# Patient Record
(demographics unavailable — no encounter records)

---

## 2024-12-02 NOTE — HISTORY OF PRESENT ILLNESS
[Current] : Current [TextBox_13] : Ms. DRISCOLL is a 67 year old female.   She was called to review eligibility for Low-Dose CT lung cancer screening.  Reviewed and confirmed that the patient meets screening eligibility criteria:   67 years old   Smoking Status:  Current Smoker   Number of pack(s) per day: 1/2 Number of years smoked: 40 Number of pack years smokin   No symptoms of lung cancer, including new cough, change in cough, hemoptysis, and unintentional weight loss.   No personal history of lung cancer.  No lung cancer in a first degree relative.  No history of lung disease or occupational exposures. [PacksperYear] : 20

## 2024-12-04 NOTE — ASSESSMENT
[FreeTextEntry1] : We reviewed the findings and the history. Questions were answered and concerns addressed. The options were outlined. Medication use discussed.  MDP is prescribed.  PT is planned.   Patient seen by Dr. Levy Smalls, who determined the assessment and plan. Beth GAONA participated in the care of the patient, including the history and physical exam. Cinthya SOLIS, am scribing for Dr. Levy Smalls in his presence for the chief complaint, physical exam, studies, assessment, and/or plan.

## 2024-12-04 NOTE — PHYSICAL EXAM
[Right] : right shoulder [] : no swelling [Sitting] : sitting [FreeTextEntry9] : IR to T12 (this is unchanged since the surgery) L SHOULDER: 165/75/T8. [TWNoteComboBox4] : passive forward flexion 165 degrees [de-identified] : external rotation 75 degrees

## 2024-12-04 NOTE — PHYSICAL EXAM
[Right] : right shoulder [] : no swelling [Sitting] : sitting [FreeTextEntry9] : IR to T12 (this is unchanged since the surgery) L SHOULDER: 165/75/T8. [TWNoteComboBox4] : passive forward flexion 165 degrees [de-identified] : external rotation 75 degrees

## 2024-12-04 NOTE — CONSULT LETTER
[Dear  ___] : Dear  [unfilled], [Consult Letter:] : I had the pleasure of evaluating your patient, [unfilled]. [Please see my note below.] : Please see my note below. [Consult Closing:] : Thank you very much for allowing me to participate in the care of this patient.  If you have any questions, please do not hesitate to contact me. [Sincerely,] : Sincerely, [FreeTextEntry3] : Levy Smalls M.D. Shoulder Surgery

## 2024-12-04 NOTE — IMAGING
[Right] : right shoulder [FreeTextEntry1] : 2V: The GH is OK.  There are AC changes. [FreeTextEntry5] : 2V: There is a Type I acromion with a prior acromioplasty.

## 2024-12-04 NOTE — REASON FOR VISIT
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  No injury.  On 3/10/24, Dr. Smalls performed a Right Shoulder Arthroscopy, Glenohumeral Debridement, Subacromial Decompression, Supraspinatus Repair (6.5mm SpiraLock), Distal Clavicle Resection.  She recovered very well, with no issues.  There can be pain at night.  Reaching is sore.  She has tried Advil and Tylenol.  No numbness, though she has right hand issues.  There is a trigger finger, and she had prior R CMC joint reconstruction. Her  recently had his second total hip replacement and she has increased her activity level.

## 2024-12-04 NOTE — HISTORY OF PRESENT ILLNESS
[4] : 4 [0] : 0 [Dull/Aching] : dull/aching [Constant] : constant [Household chores] : household chores [Leisure] : leisure [Sleep] : sleep [Lying in bed] : lying in bed [Retired] : Work status: retired [de-identified] : Right shoulder pain for a few months. No known injury. [] : no [FreeTextEntry1] : Right shoulder [FreeTextEntry9] : meloxicam  and/or Tylenol [de-identified] : Dr Smalls [de-identified] : 2008 [de-identified] : xray and sono

## 2024-12-12 NOTE — PROCEDURE
[FreeTextEntry1] : Full PFT reveals normal flows; FEV1 was 2.51 L which is 133% of predicted; normal lung volumes; normal diffusion at 4.86, which is 122% of predicted; normal flow volume loop. PFTs were performed to evaluate for SOB and COPD  FENO was 10; a normal value being less than 25 Fractional exhaled nitric oxide (FENO) is regarded as a simple, noninvasive method for assessing eosinophilic airway inflammation. Produced by a variety of cells within the lung, nitric oxide (NO) concentrations are generally low in healthy individuals. However, high concentrations of NO appear to be involved in nonspecific host defense mechanisms and chronic inflammatory diseases such as asthma. The American Thoracic Society (ATS) therefore has recommended using FENO to aid in the diagnosis and monitoring of eosinophilic airway inflammation and asthma, and for identifying steroid responsive individuals whose chronic respiratory symptoms may be caused by airway inflammation.   The American Thoracic Society (ATS) strongly recommends the use of FeNO measurement to aid in the assessment, management, and long-term monitoring of asthma. In their 2011 clinical practice guideline, the ATS emphasizes the importance of using FeNO.

## 2024-12-12 NOTE — HISTORY OF PRESENT ILLNESS
[FreeTextEntry1] : Ms. Aguero is a 67 year old female with a history of abnormal chest CT, allergic rhinitis, asthma, COPD, current smoker, fatigue, GERD, MARK, PND, presenting to the office today for a follow up pulmonary evaluation. Her chief complaint is  -she notes feeling generally well -she notes having a device placed in her heart  -she notes energy levels are improved  -she notes reporting she had a small stroke which has affected her balance slightly  -she denies dysphonia -she notes vision is stable -she notes SOB when bending over  -she notes good quality of sleep -she notes she is using her CPAP   -she denies any headaches, nausea, emesis, fever, chills, sweats, chest pain, chest pressure, coughing, wheezing, palpitations, diarrhea, constipation, dysphagia, vertigo, arthralgias, myalgias, leg swelling, itchy eyes, itchy ears, heartburn, reflux, or sour taste in the mouth.

## 2024-12-12 NOTE — ASSESSMENT
[FreeTextEntry1] : Ms. Maldonado is a 67 year old female who has a history of immunodeficiency, DM, HLD, Hypothyroid, MTHFR mutation, asthma, allergy, GERD, OSAS, abnormal CT, LPR Sx; s/p RSV infection - symptoms of LPR; stable, retired- CT c/w respiratory bronchiolitis s/p PFO closure (s/p CVA)  Her shortness of breath is multifactorial due to: -likely allergy related -asthma -COPD -current everyday smoking -? CAD -poor breathing mechanics -GERD  problem 1: asthma/COPD (controlled) -continue Trelegy 200 1 inhalation QD -continue Albuterol nebulizer Q6H (gargle and spit after use) -continue to use Ventolin PRN -continue Singulair 10 mg QHS -continue Daliresp 250 mg QD -she is being recommended to increase her compliance -Asthma is believed to be caused by inherited (genetic) and environmental factor, but its exact cause is unknown. Asthma may be triggered by allergens, lung infections, or irritants in the air. Asthma triggers are different for each person -COPD is a progressive disease and although it can't be cured , appropriate management can slow its progression, reduce frequency and severity of exacerbations, and improve symptoms and the patient quality of life. Hospitalizations are the greatest contributor to the total COPD costs and account for up to 87% of total COPD related costs. Exacerbations are the main cause of admissions and subsequently account for the 40-75% of COPD costs. Inhaled maintenance therapy reduces the incidence of exacerbations in patients with stable COPD. Incorrect inhaler use and nonadherence are major obstacles to achieving COPD treatment goals. Many COPD patients have challenges (impaired inhalation, limited dexterity, reduced cognition: that limit their ability to correctly use their COPD treatment devices resulting in reduced symptom control. Of most importance is smoking cessation and early intervention with respiratory illnesses and contemplation for pulmonary rehab to enhance quality of life. -Inhaler technique reviewed as well as oral hygiene techniques reviewed with patient. Avoidance of cold air, extremes of temperature, rescue inhaler should be used before exercise. Order of medication reviewed with patient. Recommended use of a cool mist humidifier in the bedroom.  problem 1 A: cardiac (CVA) -s/p PFO closure 2024 -Tawana  problem 2: allergies and sinuses (quiet) -add Pulmicort 0.5% via Navage  -s/p  OTC antihistamine -continue to use nasal saline -recommended to use "Navage" nasal rinse device -followed by Astelin.15 1 sniff each nostril BID -continue Flonase, 1 sniff each nostril BID -continue to use OTC antihistamine PRN -Environmental measures for allergies were encouraged including mattress and pillow cover, air purifier, and environmental controls.  problem 3: GERD / LPR #1- controlled -Maurilio:Ablation- pending -Protonix 40 mg before breakfast -continue Pepcid 40 mg QHS -Rule of 2s: avoid eating too much, eating too late, eating too spicy, eating two hours before bed -Things to avoid including overeating, spicy foods, tigh-t clothing, eating within three hours of bed, this list is not all inclusive. -For treatment of reflux, possible options discussed including diet control, H2 blockers, PPIs, as well as coating motility agents discussed as treatment options. Timing of meals and proximity of last meal to sleep were discussed. If symptoms persist, a formal gastrointestinal evaluation is needed.  problem 4: current smoking (rediscussed) -6/2024- in progress -Nicotrol inhaler - s/p Wellbutrin  -Discussed for five minutes with the patient the risks/associations with continued smoking including COPD, emphysema, shortness of breath, renal cancer, bladder cancer, stroke risk, cardiac disease, etc. Smoking cessation was discussed at length and highly encouraged. Various options to aid cessation was discussed including use of Chantix, Nicotrol, nicotine products, laser therapy, hypnosis, Wellbutrin, etc.  problem 5: low immunity -recommended to continue to follow up with hematologist/immunologist if needed -recommended to have more blood work including: IgG subsets, strep pneumoniae titers, vitamin D level  problem 6: lung cancer screening (c/w smokers bronchiolitis) -repeat CT of the chest in 12/2025 - Lung cancer screening is recommended for people between the ages of 55 and 80 with prior 30+ pack smoking histories. There is not been irrefutable evidence for realization of lung cancer screening based on two large randomized control trials demonstrating relative reduction in lung cancer mortality for patients undergoing low dose CT scanning. Risks and benefits reviewed with the patient.  prpblem 7: OSAS -she is to continue to use her CPAP machine, tolerating it well -Sleep apnea is associated with adverse clinical consequences which an affect most organ systems. Cardiovascular disease risk includes arrhythmias, atrial fibrillation, hypertension, coronary artery disease, and stroke. Metabolic disorders include diabetes type 2, non-alcoholic fatty liver disease. Mood disorder especially depression; and cognitive decline especially in the elderly. Associations with chronic reflux/Craig's esophagus some but not all inclusive. -Reasons to assess this include arousal consistent with hypopnea; respiratory events most prominent in REM sleep or supine position; therefore sleep staging and body position are important for accurate diagnosis and estimation of AHI. -According to the National Heart, Lung and Blood [place], CPAP or continuous positive airway pressure is a treatment that uses mild air pressure to keep breathing airways open. A CPAP machine includes a mask or other device that fits over the nose or nose and mouth. The mask is connected to a machine via the tube through which humidified air is blown. In the cases of obstructive sleep apnea, CPAP can reverse the complete blockages or narrowing of upper airways. Following diagnosis, CPAP machine pressures can be determined by a CPAP titration. Individuals who require CPAP can choose among masks and equipment that meet prescription and maximize comfort. Many become accustomed right away while others could require more time. Problems include uncomfortable masks or air leakage which can be adjusted to optimize compliance.  Problem 8: Cardiac -Follow-up with cardiologist (Dr. Sigifredo Cobb) if needed -Complete CPEST  problem 8: Health Maintenance/COVID19 Precautions -s/p Covid-19 vaccine x2 Moderna -Covid 19 vaccine discussed at length with patient; booster discussed and recommended to wait for vaccine containing new delta variant - Clean your hands often. Wash your hands often with soap and water for at least 20 seconds, especially after blowing your nose, coughing, or sneezing, or having been in a public place. - If soap and water are not available, use a hand  that contains at least 60% alcohol. - To the extent possible, avoid touching high-touch surfaces in public places - elevator buttons, door handles, handrails, handshaking with people, etc. Use a tissue or your sleeve to cover your hand or finger if you must touch something. - Wash your hands after touching surfaces in public places. - Avoid touching your face, nose, eyes, etc. - Clean and disinfect your home to remove germs: practice routine cleaning of frequently touched surfaces (for example: tables, doorknobs, light switches, handles, desks, toilets, faucets, sinks & cell phones) - Avoid crowds, especially in poorly ventilated spaces. Your risk of exposure to respiratory viruses like COVID-19 may increase in crowded, closed-in settings with little air circulation if there are people in the crowd who are sick. All patients are recommended to practice social distancing and stay at least 6 feet away from others. - Avoid all non-essential travel including plane trips, and especially avoid embarking on cruise ships. -If COVID-19 is spreading in your community, take extra measures to put distance between yourself and other people to further reduce your risk of being exposed to this new virus. -Stay home as much as possible. - Consider ways of getting food brought to your house through family, social, or commercial networks -Be aware that the virus has been known to live in the air up to 3 hours post exposure, cardboard up to 24 hours post exposure, copper up to 4 hours post exposure, steel and plastic up to 2-3 days post exposure. Risk of transmission from these surfaces are affected by many variables. COVID-19 precautionary Immune Support Recommendations: -OTC Vitamin C 500mg BID -OTC Quercetin 250-500mg BID -OTC Zinc 75-100mg per day -OTC Melatonin 1 or 2mg a night -OTC Vitamin D 1-4000mg per day -OTC Tonic Water 8oz per day -Water 0.5-1 gallon per day Asthma and COVID19: You need to make sure your asthma is under control. This often requires the use of inhaled corticosteroids (and sometimes oral corticosteroids). Inhaled corticosteroids do not likely reduce your immune system's ability to fight infections, but oral corticosteroids may. It is important to use the steps above to protect yourself to limit your exposure to any respiratory virus.  problem 9: health maintenance -Recommended Marcin Meek book on 10 day Detox -s/p flu shot (2024) -recommended strep pneumonia vaccines: s/p Prevnar-13 vaccine (2017), Pneumo vaccine 23 (2018) -recommended early intervention for URIs -recommended regular osteoporosis evaluations -recommended early dermatological evaluations -recommended after the age of 50 to the age of 70, colonoscopy every 5 years  F/P in 4 months She is encouraged to call with any changes, concerns, or questions.

## 2024-12-12 NOTE — REASON FOR VISIT
[Follow-Up] : a follow-up visit [FreeTextEntry1] : abnormal chest CT, allergic rhinitis, asthma, COPD, current smoker, fatigue, GERD, MARK, PND

## 2024-12-12 NOTE — ADDENDUM
[FreeTextEntry1] : Documented by Dirk Whittaker acting as a scribe for Dr. Aris Craft on 12/12/2024. All medical record entries made by the Scribe were at my, Dr. Aris Craft's, direction and personally dictated by me on 12/12/2024. I have reviewed the chart and agree that the record accurately reflects my personal performance of the history, physical exam, assessment and plan. I have also personally directed, reviewed, and agree with the discharge instructions.

## 2025-04-09 NOTE — HISTORY OF PRESENT ILLNESS
[6] : 6 [0] : 0 [Retired] : Work status: retired [de-identified] : 4/9/2025-Patient is here for a follow up on her right shoulder. Pain is worsening. Advil is helping. She has an MRI from [facility] dated 3/28/25. [de-identified] : She recently stopped PT because she needs to do Balance Therapy.

## 2025-04-09 NOTE — ASSESSMENT
[FreeTextEntry1] : ---- We reviewed her MRI findings. We discussed the indications for rotator cuff repair, which she is indicated for. She is unsure she wants to proceed at this moment. She will need to assist her  after his TKA. An injection, medication, Physical Therapy are considered.  A Medrol dose pack is again prescribed, dispense 1, to be taken as directed, with risks of GI symptoms reviewed. Physical Therapy is prescribed, 2x/week for 4-6 weeks. She will follow up in 2 weeks. Questions answered.  Patient seen by Dr. Levy Smalls, who determined the assessment and plan. Beth GAONA participated in the care of the patient, including the history and physical exam. I, Janet Sheppard, am scribing for Dr. Levy Smalls in his presence for the chief complaint, physical exam, studies, assessment, and/or plan.

## 2025-04-09 NOTE — PHYSICAL EXAM
[Right] : right shoulder [Sitting] : sitting [Moderate] : moderate [Mild] : mild [5 ___] : forward flexion 5[unfilled]/5 [] : no sensory deficits [FreeTextEntry9] : IR to T12 (this is unchanged since the surgery) L SHOULDER: 165/75/T8. [TWNoteComboBox4] : passive forward flexion 165 degrees [de-identified] : external rotation 75 degrees

## 2025-04-09 NOTE — DATA REVIEWED
[FreeTextEntry1] : MRI RIGHT SHOULDER ANGELA MOORE 03/28/25: I independently reviewed the images for the first time on 04/09/25, and the clinically significant findings include: There is at least a partial retear of the rotator cuff repair. The muscle is good. Post operative changes are noted. There are slight bicipital changes. Minor glenohumeral changes are noted. There are slight AC changes.  Previously reviewed 12/4/24: X-rays of the right shoulder is as follows: Shoulder Comments: 2V: The GH is OK. There are AC changes. Scapula Comments: 2V: There is a Type I acromion with a prior acromioplasty.

## 2025-04-09 NOTE — REASON FOR VISIT
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  No injury.  On 3/10/08, Dr. Smalls performed a Right Shoulder Arthroscopy, Glenohumeral Debridement, Subacromial Decompression, Supraspinatus Repair (6.5mm SpiraLock), Distal Clavicle Resection.  She recovered very well, with no issues.  There can be pain at night.  Reaching is sore.  She has tried Advil and Tylenol.  No numbness, though she has right hand issues.  There is a trigger finger, and she had prior R CMC joint reconstruction. Her  recently had his second total hip replacement and she has increased her activity level.  On 4/4/25, she did some Physical Therapy without relief. Dr. Bull ordered a RT shoulder MRI.

## 2025-04-23 NOTE — REASON FOR VISIT
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  No injury.  On 3/10/08, Dr. Smalls performed a Right Shoulder Arthroscopy, Glenohumeral Debridement, Subacromial Decompression, Supraspinatus Repair (6.5mm SpiraLock), Distal Clavicle Resection.  She recovered very well, with no issues.  There can be pain at night.  Reaching is sore.  She has tried Advil and Tylenol.  No numbness, though she has right hand issues.  There is a trigger finger, and she had prior R CMC joint reconstruction. Her  recently had his second total hip replacement and she has increased her activity level.  On 4/4/25, she did some Physical Therapy without relief. Dr. Bull ordered a RT shoulder MRI. 4/23/25, She started PT yesterday. The MDP helped.

## 2025-04-23 NOTE — PHYSICAL EXAM
[Right] : right shoulder [Sitting] : sitting [Moderate] : moderate [Mild] : mild [5 ___] : forward flexion 5[unfilled]/5 [] : no sensory deficits [FreeTextEntry9] : L SHOULDER: 165/75/T8. [TWNoteComboBox4] : passive forward flexion 165 degrees [TWNoteComboBox6] : internal rotation L1 [de-identified] : external rotation 70 degrees

## 2025-04-23 NOTE — ASSESSMENT
[FreeTextEntry1] : ---- We discussed his course.  We discussed the indications for rotator cuff repair, which she is indicated for. She would like to schedule this after her 's TKA in the fall 2025.  Physical Therapy is prescribed, 2x/week for 4-6 weeks. An injection is indicated today, and the patient agrees.  Questions answered.  Patient seen by Dr. Levy Smalls, who determined the assessment and plan. Beth GAONA participated in the care of the patient, including the history and physical exam. ICinthya, am scribing for Dr. Levy Smalls in his presence for the chief complaint, physical exam, studies, assessment, and/or plan. __ Procedure Name: Large Joint Injection: Depomedrol, Lidocaine and Guidance Ultrasound   Large Joint Injection was performed because of pain and inflammation. Depomedrol: An injection of Depomedrol 40 mg/cc, 2 cc. Lidocaine: An injection of Lidocaine 1 mg/cc, 13 cc.   Ultrasound Guidance was used for the following reasons: for precise injection in the area of inflammation and tearing. Visualization of the needle and placement of injection was performed without complication. Imaging saved.   Medication was injected in the right subacromial space and glenohumeral joint from a posterior approach using a 22G needle. The risks, benefits, and alternatives to steroid injection were explained in full to the patient. Risks outlined include but are not limited to infection, sepsis, bleeding, scarring, skin discoloration, temporary increase in pain, syncopal episode, failure to resolve symptoms, allergic reaction, symptom recurrence, and elevation of blood sugar in diabetics. Patient understood the risks. All questions were answered. After discussion, patient requested an injection. Oral informed consent was obtained. Sterile preparation with betadine and aseptic technique was utilized for the procedure, including the preparation of the solutions used for the injection. Patient tolerated the procedure well. Post Procedure Instructions: Patient was advised to call if redness, pain, or fever occur and apply ice for 15 min. out of every hour for the next 12-24 hours as tolerated. Patient was advised to rest the joint for 3 days. Advised to ice the injection site this evening.

## 2025-06-01 NOTE — ASSESSMENT
[FreeTextEntry1] : 67 yr old female with a history of hyperlipidemia, COPD, current everyday smoker, GERD, Hiatal hernia, MARK, DM, MTHFR mutation, a papillary fibroelastoma of the aortic valve, and a PFO. 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 She has PFO closure 9/9/2024 Initially palpitations developed but has since improved.  She needs shoulder surgery so will plan on a stress echo prior to anesthesia.  She is tolerating rosuvastatin and will assess labs to ensure cardiac risk reduction optimized.  She remains tobacco free.  Plan on follow up in a year unless symptoms change or post op issues with surgery.

## 2025-06-01 NOTE — HISTORY OF PRESENT ILLNESS
[FreeTextEntry1] : 67 yr old female with a history of hyperlipidemia, COPD, current everyday smoker, GERD, Hiatal hernia, MARK, DM, MTHFR mutation, a papillary fibroelastoma of the aortic valve, and a PFO. She has been diagnosed with asthma and frequent bronchitis. She denies episodes of chest pain but notes shortness of breath at rest and with exercise. The dyspnea on exertion, fatigue is stable, and she feels better off asthma medication. She saw allergy and Dr. Fitzgerald who suggested that she continue current conservative therapy and noted that her IgG levels were coming up. A CTA in 2014 showed normal coronaries and a calcium score of zero with an incidental finding of a 5 mm nodule RLL is being followed by pulmonary. Follow up CT with evidence of interstitial lung disease but pulmonary evaluation did not find evidence of IPF.  Echo in April 2023 with LVEF 63% and no evidence of aortic fibroelastoma.  Pulmonary pressures then were normal.  Cardiac catheterization in 11/2019 was normal. She continues to have low IgG which is being monitored and is fully vaccinated. She has lost weight and is back to the gym and feeling great.  She is retired now and is back to the gym and exercising. 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 She has PFO closure 9/9/2024.  She notes that the palpitations have resolved but with the increased stress of her 's orthopedic issues, she notes some palpitations.   She has not felt well with heart rates in the low 50s and this has been since starting metoprolol.  She exercises with no chest pain or significant dyspnea.  She has stopped smoking as well but is on doxycycline for recent bronchitis.  She is going to need shoulder surgery in the next few months.      Recent labs with  on Zetia.

## 2025-06-01 NOTE — REVIEW OF SYSTEMS
[Negative] : Heme/Lymph [Weight Loss (___ Lbs)] : [unfilled] ~Ulb weight loss [Feeling Fatigued] : not feeling fatigued [FreeTextEntry5] : very aware of heart beats and  [FreeTextEntry6] : see HPI [FreeTextEntry7] : acid reflux more active but always resolves with short term Protonix

## 2025-06-01 NOTE — PHYSICAL EXAM
[Well Developed] : well developed [Well Nourished] : well nourished [No Acute Distress] : no acute distress [Normal Conjunctiva] : normal conjunctiva [Normal Venous Pressure] : normal venous pressure [No Carotid Bruit] : no carotid bruit [Normal S1, S2] : normal S1, S2 [No Rub] : no rub [No Gallop] : no gallop [Clear Lung Fields] : clear lung fields [Good Air Entry] : good air entry [Soft] : abdomen soft [Non Tender] : non-tender [No Masses/organomegaly] : no masses/organomegaly [Normal Bowel Sounds] : normal bowel sounds [Normal Gait] : normal gait [No Edema] : no edema [No Cyanosis] : no cyanosis [No Clubbing] : no clubbing [No Varicosities] : no varicosities [No Rash] : no rash [No Skin Lesions] : no skin lesions [Moves all extremities] : moves all extremities [No Focal Deficits] : no focal deficits [Normal Speech] : normal speech [Alert and Oriented] : alert and oriented [Normal memory] : normal memory [de-identified] : soft 2/6 systolic murmur

## 2025-06-01 NOTE — CARDIOLOGY SUMMARY
[de-identified] : 5/23/2025:  Sinus Rhythm  Incomplete right bundle branch block. [de-identified] : 10/8/2024:  LVEF 60-65%, aortic root 3.4 cm, IAS with closure device and no flow [de-identified] : 9/9/2024:  Amplatz placed

## 2025-06-01 NOTE — PHYSICAL EXAM
[Well Developed] : well developed [Well Nourished] : well nourished [No Acute Distress] : no acute distress [Normal Conjunctiva] : normal conjunctiva [Normal Venous Pressure] : normal venous pressure [No Carotid Bruit] : no carotid bruit [Normal S1, S2] : normal S1, S2 [No Rub] : no rub [No Gallop] : no gallop [Clear Lung Fields] : clear lung fields [Good Air Entry] : good air entry [Soft] : abdomen soft [Non Tender] : non-tender [No Masses/organomegaly] : no masses/organomegaly [Normal Bowel Sounds] : normal bowel sounds [Normal Gait] : normal gait [No Edema] : no edema [No Cyanosis] : no cyanosis [No Clubbing] : no clubbing [No Varicosities] : no varicosities [No Rash] : no rash [No Skin Lesions] : no skin lesions [Moves all extremities] : moves all extremities [No Focal Deficits] : no focal deficits [Normal Speech] : normal speech [Alert and Oriented] : alert and oriented [Normal memory] : normal memory [de-identified] : soft 2/6 systolic murmur

## 2025-06-01 NOTE — CARDIOLOGY SUMMARY
[de-identified] : 5/23/2025:  Sinus Rhythm  Incomplete right bundle branch block. [de-identified] : 10/8/2024:  LVEF 60-65%, aortic root 3.4 cm, IAS with closure device and no flow [de-identified] : 9/9/2024:  Amplatz placed

## 2025-06-01 NOTE — REVIEW OF SYSTEMS
[Negative] : Heme/Lymph [Weight Loss (___ Lbs)] : [unfilled] ~Ulb weight loss [Feeling Fatigued] : not feeling fatigued [FreeTextEntry5] : very aware of heart beats and  [FreeTextEntry7] : acid reflux more active but always resolves with short term Protonix [FreeTextEntry6] : see HPI